# Patient Record
Sex: MALE | Race: WHITE | NOT HISPANIC OR LATINO | Employment: OTHER | URBAN - METROPOLITAN AREA
[De-identification: names, ages, dates, MRNs, and addresses within clinical notes are randomized per-mention and may not be internally consistent; named-entity substitution may affect disease eponyms.]

---

## 2023-03-03 PROBLEM — M25.579 ANKLE PAIN: Status: ACTIVE | Noted: 2023-03-03

## 2023-03-03 PROBLEM — T50.905A MEDICATION REACTION: Status: ACTIVE | Noted: 2023-03-03

## 2023-03-03 PROBLEM — E29.1 HYPOGONADISM, MALE: Status: ACTIVE | Noted: 2023-03-03

## 2023-03-03 PROBLEM — F32.A DEPRESSION: Status: ACTIVE | Noted: 2023-03-03

## 2023-03-03 PROBLEM — E78.5 HYPERLIPIDEMIA: Status: ACTIVE | Noted: 2023-03-03

## 2023-03-03 PROBLEM — M79.672 LEFT FOOT PAIN: Status: ACTIVE | Noted: 2023-03-03

## 2023-03-03 PROBLEM — I10 HTN (HYPERTENSION), BENIGN: Status: ACTIVE | Noted: 2023-03-03

## 2023-03-03 PROBLEM — R21 RASH: Status: ACTIVE | Noted: 2023-03-03

## 2023-03-03 RX ORDER — LOVASTATIN 40 MG/1
40 TABLET ORAL DAILY
COMMUNITY
Start: 2015-07-01 | End: 2023-04-25 | Stop reason: SDUPTHER

## 2023-03-03 RX ORDER — TRIAMCINOLONE ACETONIDE 5 MG/G
1 CREAM TOPICAL 3 TIMES DAILY
COMMUNITY
Start: 2022-07-19 | End: 2023-10-24 | Stop reason: ALTCHOICE

## 2023-03-03 RX ORDER — LAMOTRIGINE 150 MG/1
2 TABLET ORAL DAILY
COMMUNITY
Start: 2015-05-11 | End: 2023-04-25 | Stop reason: WASHOUT

## 2023-03-03 RX ORDER — FLUOXETINE HYDROCHLORIDE 40 MG/1
40 CAPSULE ORAL DAILY
COMMUNITY
Start: 2015-05-08

## 2023-03-03 RX ORDER — LISINOPRIL 40 MG/1
40 TABLET ORAL DAILY
COMMUNITY
Start: 2022-02-14 | End: 2023-04-25 | Stop reason: SDUPTHER

## 2023-03-03 RX ORDER — HYDROCHLOROTHIAZIDE 25 MG/1
25 TABLET ORAL DAILY
COMMUNITY
Start: 2022-02-14 | End: 2023-04-25

## 2023-03-03 RX ORDER — QUETIAPINE FUMARATE 300 MG/1
0.5 TABLET, FILM COATED ORAL NIGHTLY
COMMUNITY
Start: 2014-05-07

## 2023-03-03 RX ORDER — AMLODIPINE BESYLATE 10 MG/1
10 TABLET ORAL DAILY
COMMUNITY
Start: 2022-03-16 | End: 2023-04-25 | Stop reason: SDUPTHER

## 2023-03-03 RX ORDER — AMLODIPINE BESYLATE 5 MG/1
5 TABLET ORAL
COMMUNITY
End: 2023-04-25 | Stop reason: ALTCHOICE

## 2023-03-03 RX ORDER — CLOBETASOL PROPIONATE 0.5 MG/G
1 CREAM TOPICAL 2 TIMES DAILY
COMMUNITY
End: 2023-10-24 | Stop reason: ALTCHOICE

## 2023-03-09 ENCOUNTER — APPOINTMENT (OUTPATIENT)
Dept: PRIMARY CARE | Facility: CLINIC | Age: 63
End: 2023-03-09

## 2023-04-25 ENCOUNTER — LAB (OUTPATIENT)
Dept: LAB | Facility: LAB | Age: 63
End: 2023-04-25
Payer: COMMERCIAL

## 2023-04-25 ENCOUNTER — OFFICE VISIT (OUTPATIENT)
Dept: PRIMARY CARE | Facility: CLINIC | Age: 63
End: 2023-04-25
Payer: COMMERCIAL

## 2023-04-25 VITALS
DIASTOLIC BLOOD PRESSURE: 88 MMHG | HEART RATE: 88 BPM | HEIGHT: 70 IN | SYSTOLIC BLOOD PRESSURE: 138 MMHG | WEIGHT: 233.8 LBS | BODY MASS INDEX: 33.47 KG/M2 | OXYGEN SATURATION: 94 %

## 2023-04-25 DIAGNOSIS — Z13.29 SCREENING FOR THYROID DISORDER: ICD-10-CM

## 2023-04-25 DIAGNOSIS — Z12.5 SCREENING PSA (PROSTATE SPECIFIC ANTIGEN): ICD-10-CM

## 2023-04-25 DIAGNOSIS — E78.5 HYPERLIPIDEMIA, UNSPECIFIED HYPERLIPIDEMIA TYPE: ICD-10-CM

## 2023-04-25 DIAGNOSIS — Z13.6 SCREENING FOR HEART DISEASE: ICD-10-CM

## 2023-04-25 DIAGNOSIS — I10 HTN (HYPERTENSION), BENIGN: ICD-10-CM

## 2023-04-25 DIAGNOSIS — I10 HTN (HYPERTENSION), BENIGN: Primary | ICD-10-CM

## 2023-04-25 PROBLEM — K92.2 GASTROINTESTINAL HEMORRHAGE: Status: ACTIVE | Noted: 2019-03-19

## 2023-04-25 PROBLEM — M77.32 CALCANEAL SPUR, LEFT FOOT: Status: ACTIVE | Noted: 2022-07-19

## 2023-04-25 PROBLEM — K57.32 DIVERTICULITIS OF COLON: Status: ACTIVE | Noted: 2019-03-19

## 2023-04-25 PROBLEM — R22.42 LOCALIZED SWELLING, MASS AND LUMP, LEFT LOWER LIMB: Status: ACTIVE | Noted: 2022-07-19

## 2023-04-25 PROBLEM — M19.072 PRIMARY OSTEOARTHRITIS, LEFT ANKLE AND FOOT: Status: ACTIVE | Noted: 2022-07-19

## 2023-04-25 PROCEDURE — 3075F SYST BP GE 130 - 139MM HG: CPT | Performed by: NURSE PRACTITIONER

## 2023-04-25 PROCEDURE — 80061 LIPID PANEL: CPT

## 2023-04-25 PROCEDURE — 1036F TOBACCO NON-USER: CPT | Performed by: NURSE PRACTITIONER

## 2023-04-25 PROCEDURE — 99214 OFFICE O/P EST MOD 30 MIN: CPT | Performed by: NURSE PRACTITIONER

## 2023-04-25 PROCEDURE — 3079F DIAST BP 80-89 MM HG: CPT | Performed by: NURSE PRACTITIONER

## 2023-04-25 PROCEDURE — 84443 ASSAY THYROID STIM HORMONE: CPT

## 2023-04-25 PROCEDURE — 80053 COMPREHEN METABOLIC PANEL: CPT

## 2023-04-25 PROCEDURE — 85025 COMPLETE CBC W/AUTO DIFF WBC: CPT

## 2023-04-25 PROCEDURE — 84153 ASSAY OF PSA TOTAL: CPT

## 2023-04-25 PROCEDURE — 36415 COLL VENOUS BLD VENIPUNCTURE: CPT

## 2023-04-25 RX ORDER — BETAMETHASONE DIPROPIONATE 0.5 MG/G
CREAM TOPICAL 2 TIMES DAILY
COMMUNITY
Start: 2022-04-08 | End: 2023-10-24 | Stop reason: ALTCHOICE

## 2023-04-25 RX ORDER — LISINOPRIL 40 MG/1
40 TABLET ORAL DAILY
Qty: 90 TABLET | Refills: 1 | Status: SHIPPED | OUTPATIENT
Start: 2023-04-25 | End: 2023-10-24 | Stop reason: SDUPTHER

## 2023-04-25 RX ORDER — LISINOPRIL AND HYDROCHLOROTHIAZIDE 20; 25 MG/1; MG/1
1 TABLET ORAL DAILY
COMMUNITY
Start: 2022-01-28 | End: 2023-04-25

## 2023-04-25 RX ORDER — LOVASTATIN 40 MG/1
40 TABLET ORAL DAILY
Qty: 90 TABLET | Refills: 1 | Status: SHIPPED | OUTPATIENT
Start: 2023-04-25 | End: 2023-10-24 | Stop reason: SDUPTHER

## 2023-04-25 RX ORDER — LAMOTRIGINE 150 MG/1
300 TABLET ORAL DAILY
COMMUNITY

## 2023-04-25 RX ORDER — AMLODIPINE BESYLATE 10 MG/1
10 TABLET ORAL DAILY
Qty: 90 TABLET | Refills: 1 | Status: SHIPPED
Start: 2023-04-25 | End: 2023-10-24 | Stop reason: SDUPTHER

## 2023-04-25 ASSESSMENT — ENCOUNTER SYMPTOMS
CONSTITUTIONAL NEGATIVE: 1
PSYCHIATRIC NEGATIVE: 1
SLEEP DISTURBANCE: 0
ROS GI COMMENTS: RARE GERD
HEMATOLOGIC/LYMPHATIC NEGATIVE: 1
GASTROINTESTINAL NEGATIVE: 1
NEUROLOGICAL NEGATIVE: 1
RESPIRATORY NEGATIVE: 1

## 2023-04-25 NOTE — PATIENT INSTRUCTIONS
Goal of 150 minutes of moderate activity a week  Labs are fasting 10-1 2 hours, do drink water  Coronary calcium score ordered.

## 2023-04-25 NOTE — PROGRESS NOTES
"Subjective   Patient ID: Armen Colvin is a 63 y.o. male who presents for Establish Care (New pt to get established. Former tiffany pt ).    HPI   Patient here to establish with provider, Last seen by Tiffany Park 07/19/2022  Has horrible seasonal allergies and started on Loratadine- Eye itching and top of mouth itchy.  Does occassionally check blood pressure at home- but not recently.  Works as .    Current concerns:  None- concerned somewhat regarding recent heart attack his older brother had, how at risk is he for similar issue?   Chronic Concerns: Hypertension, Depression, Dyslipidemia.  Specialist:   -Counseling- Jeffy-visits once 2-3 months. Manages medications   Labs 02/14/2022   NON SMOKER, ALCOHOL INTAKE- Beer  4-5 cans twice a week during golf season.     Review of Systems   Constitutional: Negative.    HENT:          Noted in HPI   Respiratory: Negative.     Cardiovascular:         Occasional irregular beat  2 x month typically.    Gastrointestinal: Negative.         Rare GERD   Genitourinary: Negative.    Skin: Negative.    Neurological: Negative.    Hematological: Negative.    Psychiatric/Behavioral: Negative.  Negative for sleep disturbance.        Objective   /88 (BP Location: Right arm, Patient Position: Sitting, BP Cuff Size: Large adult)   Pulse 88   Ht 1.778 m (5' 10\")   Wt 106 kg (233 lb 12.8 oz)   SpO2 94%   BMI 33.55 kg/m²     Physical Exam  Vitals reviewed.   Constitutional:       Appearance: Normal appearance.   HENT:      Nose: Nose normal.      Mouth/Throat:      Mouth: Mucous membranes are moist.   Eyes:      Pupils: Pupils are equal, round, and reactive to light.   Neck:      Thyroid: No thyromegaly.   Cardiovascular:      Rate and Rhythm: Normal rate and regular rhythm.      Heart sounds: Normal heart sounds.   Pulmonary:      Effort: Pulmonary effort is normal.      Breath sounds: Normal breath sounds.   Musculoskeletal:         General: Normal range of motion.    "   Cervical back: Normal range of motion and neck supple.   Lymphadenopathy:      Cervical: No cervical adenopathy.   Skin:     General: Skin is warm.   Neurological:      General: No focal deficit present.      Mental Status: He is alert.   Psychiatric:         Mood and Affect: Mood normal.         Behavior: Behavior normal.         Judgment: Judgment normal.       Assessment/Plan   Health Maintenance  Labs- updated orders provided today  Influenza- unknown   Prevnar 13/20- not indicated   Shingrix- Unknown   Colonoscopy- reports few years ago.   PSA 1.26 (02/14/2022)   CT cardiac score-order provided today    Diagnoses and all orders for this visit:  HTN (hypertension), benign  Recommended keeping BP log- communicate readings through My Chart  Has had some swelling of ankles since increase of Amlodipine 10 mg- discussed with him if concerning can change medications etc. At this time does not wish to do.   -     Albumin , Urine Random; Future  -     Comprehensive Metabolic Panel; Future  -     CBC and Auto Differential; Future  -     amLODIPine (Norvasc) 10 mg tablet; Take 1 tablet (10 mg) by mouth once daily.  -     lisinopril 40 mg tablet; Take 1 tablet (40 mg) by mouth once daily.  Hyperlipidemia, unspecified hyperlipidemia type- Lipid panel 02/14/2022 T chol 179, Triglycerides 180, HDL 55, LDL 87  -     Comprehensive Metabolic Panel; Future  -     Lipid Panel; Future  -     CBC and Auto Differential; Future  -     lovastatin (Mevacor) 40 mg tablet; Take 1 tablet (40 mg) by mouth once daily.  Screening for thyroid disorder  -     TSH with reflex to Free T4 if abnormal; Future  Screening PSA (prostate specific antigen) Previous PSA= 1.26 (02/14/2022)   -     Prostate Spec.Ag,Screen; Future  Screening for heart disease  -     CT cardiac scoring wo IV contrast; Future     PLAN/FOLLOW UP  6 months  Lab orders-> call with results  CT cardiac score-> call with results.

## 2023-04-26 LAB
ALANINE AMINOTRANSFERASE (SGPT) (U/L) IN SER/PLAS: 43 U/L (ref 10–52)
ALBUMIN (G/DL) IN SER/PLAS: 5.2 G/DL (ref 3.4–5)
ALKALINE PHOSPHATASE (U/L) IN SER/PLAS: 105 U/L (ref 33–136)
ANION GAP IN SER/PLAS: 16 MMOL/L (ref 10–20)
ASPARTATE AMINOTRANSFERASE (SGOT) (U/L) IN SER/PLAS: 28 U/L (ref 9–39)
BASOPHILS (10*3/UL) IN BLOOD BY AUTOMATED COUNT: 0.04 X10E9/L (ref 0–0.1)
BASOPHILS/100 LEUKOCYTES IN BLOOD BY AUTOMATED COUNT: 0.7 % (ref 0–2)
BILIRUBIN TOTAL (MG/DL) IN SER/PLAS: 1 MG/DL (ref 0–1.2)
CALCIUM (MG/DL) IN SER/PLAS: 10.9 MG/DL (ref 8.6–10.6)
CARBON DIOXIDE, TOTAL (MMOL/L) IN SER/PLAS: 28 MMOL/L (ref 21–32)
CHLORIDE (MMOL/L) IN SER/PLAS: 104 MMOL/L (ref 98–107)
CHOLESTEROL (MG/DL) IN SER/PLAS: 185 MG/DL (ref 0–199)
CHOLESTEROL IN HDL (MG/DL) IN SER/PLAS: 67.1 MG/DL
CHOLESTEROL/HDL RATIO: 2.8
CREATININE (MG/DL) IN SER/PLAS: 0.94 MG/DL (ref 0.5–1.3)
EOSINOPHILS (10*3/UL) IN BLOOD BY AUTOMATED COUNT: 0.21 X10E9/L (ref 0–0.7)
EOSINOPHILS/100 LEUKOCYTES IN BLOOD BY AUTOMATED COUNT: 3.7 % (ref 0–6)
ERYTHROCYTE DISTRIBUTION WIDTH (RATIO) BY AUTOMATED COUNT: 12.9 % (ref 11.5–14.5)
ERYTHROCYTE MEAN CORPUSCULAR HEMOGLOBIN CONCENTRATION (G/DL) BY AUTOMATED: 32.3 G/DL (ref 32–36)
ERYTHROCYTE MEAN CORPUSCULAR VOLUME (FL) BY AUTOMATED COUNT: 97 FL (ref 80–100)
ERYTHROCYTES (10*6/UL) IN BLOOD BY AUTOMATED COUNT: 4.86 X10E12/L (ref 4.5–5.9)
GFR MALE: >90 ML/MIN/1.73M2
GLUCOSE (MG/DL) IN SER/PLAS: 97 MG/DL (ref 74–99)
HEMATOCRIT (%) IN BLOOD BY AUTOMATED COUNT: 47 % (ref 41–52)
HEMOGLOBIN (G/DL) IN BLOOD: 15.2 G/DL (ref 13.5–17.5)
IMMATURE GRANULOCYTES/100 LEUKOCYTES IN BLOOD BY AUTOMATED COUNT: 0.4 % (ref 0–0.9)
LDL: 97 MG/DL (ref 0–99)
LEUKOCYTES (10*3/UL) IN BLOOD BY AUTOMATED COUNT: 5.7 X10E9/L (ref 4.4–11.3)
LYMPHOCYTES (10*3/UL) IN BLOOD BY AUTOMATED COUNT: 1.44 X10E9/L (ref 1.2–4.8)
LYMPHOCYTES/100 LEUKOCYTES IN BLOOD BY AUTOMATED COUNT: 25.4 % (ref 13–44)
MONOCYTES (10*3/UL) IN BLOOD BY AUTOMATED COUNT: 0.59 X10E9/L (ref 0.1–1)
MONOCYTES/100 LEUKOCYTES IN BLOOD BY AUTOMATED COUNT: 10.4 % (ref 2–10)
NEUTROPHILS (10*3/UL) IN BLOOD BY AUTOMATED COUNT: 3.37 X10E9/L (ref 1.2–7.7)
NEUTROPHILS/100 LEUKOCYTES IN BLOOD BY AUTOMATED COUNT: 59.4 % (ref 40–80)
NRBC (PER 100 WBCS) BY AUTOMATED COUNT: 0 /100 WBC (ref 0–0)
PLATELETS (10*3/UL) IN BLOOD AUTOMATED COUNT: 310 X10E9/L (ref 150–450)
POTASSIUM (MMOL/L) IN SER/PLAS: 4.6 MMOL/L (ref 3.5–5.3)
PROSTATE SPECIFIC ANTIGEN,SCREEN: 1.49 NG/ML (ref 0–4)
PROTEIN TOTAL: 8.1 G/DL (ref 6.4–8.2)
SODIUM (MMOL/L) IN SER/PLAS: 143 MMOL/L (ref 136–145)
THYROTROPIN (MIU/L) IN SER/PLAS BY DETECTION LIMIT <= 0.05 MIU/L: 1.22 MIU/L (ref 0.44–3.98)
TRIGLYCERIDE (MG/DL) IN SER/PLAS: 106 MG/DL (ref 0–149)
UREA NITROGEN (MG/DL) IN SER/PLAS: 16 MG/DL (ref 6–23)
VLDL: 21 MG/DL (ref 0–40)

## 2023-05-01 ENCOUNTER — LAB (OUTPATIENT)
Dept: LAB | Facility: LAB | Age: 63
End: 2023-05-01
Payer: COMMERCIAL

## 2023-05-01 DIAGNOSIS — I10 HTN (HYPERTENSION), BENIGN: ICD-10-CM

## 2023-05-01 PROCEDURE — 82043 UR ALBUMIN QUANTITATIVE: CPT

## 2023-05-01 PROCEDURE — 82570 ASSAY OF URINE CREATININE: CPT

## 2023-05-02 DIAGNOSIS — E83.52 SERUM CALCIUM ELEVATED: Primary | ICD-10-CM

## 2023-05-02 DIAGNOSIS — R77.0 ABNORMAL ALBUMIN: ICD-10-CM

## 2023-05-02 LAB
ALBUMIN (MG/L) IN URINE: 7.3 MG/L
ALBUMIN/CREATININE (UG/MG) IN URINE: 5.4 UG/MG CRT (ref 0–30)
CREATININE (MG/DL) IN URINE: 135 MG/DL (ref 20–370)

## 2023-05-02 NOTE — PROGRESS NOTES
Lab results communicated to patient through My Chart 05/02/2023. Abnormal's included elevated serum calcium and albumin. Will place orders to repeat at his convenience.  Patient made aware of all normal results.

## 2023-05-02 NOTE — RESULT ENCOUNTER NOTE
Lab results have a few abnormal's: Serum calcium level & albumin level elevated somewhat. Typically would repeat these labs few weeks out to see if they have stabilized. I will place an order and repeat at your convenience. Lipid panel has improved with triglycerides now within recommended range at 106. TSH, PSA & CBC+d, urine albumin within normal ranges.  Let me know if you have any questions.

## 2023-10-24 ENCOUNTER — OFFICE VISIT (OUTPATIENT)
Dept: PRIMARY CARE | Facility: CLINIC | Age: 63
End: 2023-10-24
Payer: COMMERCIAL

## 2023-10-24 VITALS
HEIGHT: 70 IN | WEIGHT: 245.4 LBS | HEART RATE: 80 BPM | DIASTOLIC BLOOD PRESSURE: 80 MMHG | SYSTOLIC BLOOD PRESSURE: 126 MMHG | BODY MASS INDEX: 35.13 KG/M2 | OXYGEN SATURATION: 96 %

## 2023-10-24 DIAGNOSIS — I10 HTN (HYPERTENSION), BENIGN: Primary | ICD-10-CM

## 2023-10-24 DIAGNOSIS — E78.5 HYPERLIPIDEMIA, UNSPECIFIED HYPERLIPIDEMIA TYPE: ICD-10-CM

## 2023-10-24 DIAGNOSIS — M67.449 GANGLION CYST OF JOINT OF FINGER: ICD-10-CM

## 2023-10-24 DIAGNOSIS — Z13.6 SCREENING FOR HEART DISEASE: ICD-10-CM

## 2023-10-24 PROBLEM — M77.00 MEDIAL EPICONDYLITIS OF ELBOW: Status: ACTIVE | Noted: 2023-10-24

## 2023-10-24 PROBLEM — Z86.010 HISTORY OF COLONIC POLYPS: Status: ACTIVE | Noted: 2023-10-24

## 2023-10-24 PROBLEM — Z86.59 HISTORY OF DEPRESSION: Status: ACTIVE | Noted: 2023-10-24

## 2023-10-24 PROBLEM — K62.5 RECTAL HEMORRHAGE: Status: ACTIVE | Noted: 2019-03-19

## 2023-10-24 PROBLEM — J32.9 SINUSITIS: Status: ACTIVE | Noted: 2023-10-24

## 2023-10-24 PROBLEM — Z86.0100 HISTORY OF COLONIC POLYPS: Status: ACTIVE | Noted: 2023-10-24

## 2023-10-24 PROBLEM — M77.12 LATERAL EPICONDYLITIS OF LEFT ELBOW: Status: ACTIVE | Noted: 2023-10-24

## 2023-10-24 PROBLEM — R19.7 DIARRHEA: Status: ACTIVE | Noted: 2023-10-24

## 2023-10-24 PROBLEM — R10.9 ABDOMINAL PAIN: Status: ACTIVE | Noted: 2023-10-24

## 2023-10-24 PROCEDURE — 3074F SYST BP LT 130 MM HG: CPT | Performed by: NURSE PRACTITIONER

## 2023-10-24 PROCEDURE — 3079F DIAST BP 80-89 MM HG: CPT | Performed by: NURSE PRACTITIONER

## 2023-10-24 PROCEDURE — 99214 OFFICE O/P EST MOD 30 MIN: CPT | Performed by: NURSE PRACTITIONER

## 2023-10-24 PROCEDURE — 1036F TOBACCO NON-USER: CPT | Performed by: NURSE PRACTITIONER

## 2023-10-24 RX ORDER — LISINOPRIL 40 MG/1
40 TABLET ORAL DAILY
Qty: 90 TABLET | Refills: 1 | Status: SHIPPED | OUTPATIENT
Start: 2023-10-24

## 2023-10-24 RX ORDER — METOPROLOL SUCCINATE 25 MG/1
25 TABLET, EXTENDED RELEASE ORAL DAILY
Qty: 30 TABLET | Refills: 1 | Status: SHIPPED
Start: 2023-10-24 | End: 2023-11-27 | Stop reason: SDUPTHER

## 2023-10-24 RX ORDER — LOVASTATIN 40 MG/1
40 TABLET ORAL DAILY
Qty: 90 TABLET | Refills: 1 | Status: SHIPPED
Start: 2023-10-24 | End: 2024-01-18 | Stop reason: ALTCHOICE

## 2023-10-24 RX ORDER — AMLODIPINE BESYLATE 5 MG/1
5 TABLET ORAL DAILY
Qty: 90 TABLET | Refills: 0 | Status: SHIPPED | OUTPATIENT
Start: 2023-10-24 | End: 2024-01-22

## 2023-10-24 ASSESSMENT — ENCOUNTER SYMPTOMS
GASTROINTESTINAL NEGATIVE: 1
CONSTITUTIONAL NEGATIVE: 1
RESPIRATORY NEGATIVE: 1

## 2023-10-24 ASSESSMENT — PAIN SCALES - GENERAL: PAINLEVEL: 0-NO PAIN

## 2023-10-24 NOTE — PROGRESS NOTES
"Subjective   Patient ID: Armen Colvin is a 63 y.o. male who presents for Follow-up (Follow up /LOV 4/25/23/Labs 4/25/23 and 5/1/23).    HPI   Patient here for follow up- last appt 04/25/2023.  [facility]- limited exercise overall.   Current concerns:  1) swelling in the ankles- tender feet.   Chronic concerns: HTN, Depression, Dyslipidemia  Specialist  - Counseling- manages Psychiatric medications- virtual visit    Labs 05/01/2023  NON SMOKER,  ETOH, beer during golf season     Review of Systems   Constitutional: Negative.    Respiratory: Negative.     Cardiovascular:         As noted in HPI   Gastrointestinal: Negative.    Genitourinary: Negative.    Psychiatric/Behavioral:          As noted in HPI       Objective   /80 (BP Location: Left arm, Patient Position: Sitting, BP Cuff Size: Large adult)   Pulse 80   Ht 1.778 m (5' 10\")   Wt 111 kg (245 lb 6.4 oz)   SpO2 96%   BMI 35.21 kg/m²     Physical Exam  Vitals reviewed.   Constitutional:       Appearance: He is obese.   Cardiovascular:      Rate and Rhythm: Normal rate and regular rhythm.      Heart sounds: Normal heart sounds.   Pulmonary:      Effort: Pulmonary effort is normal.      Breath sounds: Normal breath sounds.   Musculoskeletal:      Right hand: Tenderness present. Normal range of motion.      Comments: Right had base of 3 rd finger- round hard to palpation, slightly mobile nodule size approximate M & M    Neurological:      Mental Status: He is alert.       Assessment/Plan   Labs- 05/02/2023, orders placed to repeat CMP, was not completed.   Influenza- unknown   Prevnar 13/20- not indicated   Shingrix- Unknown   Colonoscopy- reports few years ago.   PSA 1.49 (04/25/2023)   CT cardiac score-2nd order provided today  Diagnoses and all orders for this visit:  HTN (hypertension), benign  Keep track of Blood pressure and keep me informed on readings so adjustment can be made if needed.  -     CT cardiac scoring wo IV " contrast; Future  - Decreasing amLODIPine (Norvasc) 10 mg -> 5 mg tablet; Take 1 tablet (5 mg) by mouth once daily.  - Initiated metoprolol succinate XL (Toprol-XL) 25 mg 24 hr tablet; Take 1 tablet (25 mg) by mouth once daily. Do not crush or chew.  -     lisinopril 40 mg tablet; Take 1 tablet (40 mg) by mouth once daily.  Hyperlipidemia, unspecified hyperlipidemia type  -     CT cardiac scoring wo IV contrast; Future  -     lovastatin (Mevacor) 40 mg tablet; Take 1 tablet (40 mg) by mouth once daily.  Screening for heart disease  -     CT cardiac scoring wo IV contrast; Future  Ganglion cyst of joint of finger  -     Referral to Orthopaedic Surgery; Future    Plan: follow up 2 months blood pressure- keep me informed BP readings for dose adjustment   CMP, lipids, CBC complete before next appt  Ct Calcium score review at follow up

## 2023-10-25 ENCOUNTER — APPOINTMENT (OUTPATIENT)
Dept: PRIMARY CARE | Facility: CLINIC | Age: 63
End: 2023-10-25
Payer: COMMERCIAL

## 2023-11-15 ENCOUNTER — LAB (OUTPATIENT)
Dept: LAB | Facility: LAB | Age: 63
End: 2023-11-15
Payer: COMMERCIAL

## 2023-11-15 DIAGNOSIS — E83.52 SERUM CALCIUM ELEVATED: ICD-10-CM

## 2023-11-15 DIAGNOSIS — I10 HTN (HYPERTENSION), BENIGN: ICD-10-CM

## 2023-11-15 DIAGNOSIS — R77.0 ABNORMAL ALBUMIN: ICD-10-CM

## 2023-11-15 DIAGNOSIS — E78.5 HYPERLIPIDEMIA, UNSPECIFIED HYPERLIPIDEMIA TYPE: ICD-10-CM

## 2023-11-15 PROCEDURE — 80061 LIPID PANEL: CPT

## 2023-11-15 PROCEDURE — 36415 COLL VENOUS BLD VENIPUNCTURE: CPT

## 2023-11-15 PROCEDURE — 85025 COMPLETE CBC W/AUTO DIFF WBC: CPT

## 2023-11-15 PROCEDURE — 80053 COMPREHEN METABOLIC PANEL: CPT

## 2023-11-16 LAB
ALBUMIN SERPL BCP-MCNC: 5 G/DL (ref 3.4–5)
ALP SERPL-CCNC: 90 U/L (ref 33–136)
ALT SERPL W P-5'-P-CCNC: 62 U/L (ref 10–52)
ANION GAP SERPL CALC-SCNC: 15 MMOL/L (ref 10–20)
AST SERPL W P-5'-P-CCNC: 34 U/L (ref 9–39)
BASOPHILS # BLD AUTO: 0.05 X10*3/UL (ref 0–0.1)
BASOPHILS NFR BLD AUTO: 0.8 %
BILIRUB SERPL-MCNC: 0.9 MG/DL (ref 0–1.2)
BUN SERPL-MCNC: 11 MG/DL (ref 6–23)
CALCIUM SERPL-MCNC: 9.8 MG/DL (ref 8.6–10.6)
CHLORIDE SERPL-SCNC: 104 MMOL/L (ref 98–107)
CHOLEST SERPL-MCNC: 185 MG/DL (ref 0–199)
CHOLESTEROL/HDL RATIO: 3.1
CO2 SERPL-SCNC: 26 MMOL/L (ref 21–32)
CREAT SERPL-MCNC: 0.86 MG/DL (ref 0.5–1.3)
EOSINOPHIL # BLD AUTO: 0.12 X10*3/UL (ref 0–0.7)
EOSINOPHIL NFR BLD AUTO: 1.9 %
ERYTHROCYTE [DISTWIDTH] IN BLOOD BY AUTOMATED COUNT: 12.3 % (ref 11.5–14.5)
GFR SERPL CREATININE-BSD FRML MDRD: >90 ML/MIN/1.73M*2
GLUCOSE SERPL-MCNC: 94 MG/DL (ref 74–99)
HCT VFR BLD AUTO: 47.1 % (ref 41–52)
HDLC SERPL-MCNC: 59.8 MG/DL
HGB BLD-MCNC: 15.5 G/DL (ref 13.5–17.5)
IMM GRANULOCYTES # BLD AUTO: 0.03 X10*3/UL (ref 0–0.7)
IMM GRANULOCYTES NFR BLD AUTO: 0.5 % (ref 0–0.9)
LDLC SERPL CALC-MCNC: 107 MG/DL
LYMPHOCYTES # BLD AUTO: 1.78 X10*3/UL (ref 1.2–4.8)
LYMPHOCYTES NFR BLD AUTO: 28.8 %
MCH RBC QN AUTO: 31.3 PG (ref 26–34)
MCHC RBC AUTO-ENTMCNC: 32.9 G/DL (ref 32–36)
MCV RBC AUTO: 95 FL (ref 80–100)
MONOCYTES # BLD AUTO: 0.69 X10*3/UL (ref 0.1–1)
MONOCYTES NFR BLD AUTO: 11.2 %
NEUTROPHILS # BLD AUTO: 3.5 X10*3/UL (ref 1.2–7.7)
NEUTROPHILS NFR BLD AUTO: 56.8 %
NON HDL CHOLESTEROL: 125 MG/DL (ref 0–149)
NRBC BLD-RTO: 0 /100 WBCS (ref 0–0)
PLATELET # BLD AUTO: 278 X10*3/UL (ref 150–450)
POTASSIUM SERPL-SCNC: 4.8 MMOL/L (ref 3.5–5.3)
PROT SERPL-MCNC: 7.8 G/DL (ref 6.4–8.2)
RBC # BLD AUTO: 4.95 X10*6/UL (ref 4.5–5.9)
SODIUM SERPL-SCNC: 140 MMOL/L (ref 136–145)
TRIGL SERPL-MCNC: 93 MG/DL (ref 0–149)
VLDL: 19 MG/DL (ref 0–40)
WBC # BLD AUTO: 6.2 X10*3/UL (ref 4.4–11.3)

## 2023-11-27 ENCOUNTER — TELEPHONE (OUTPATIENT)
Dept: PRIMARY CARE | Facility: CLINIC | Age: 63
End: 2023-11-27
Payer: COMMERCIAL

## 2023-11-27 DIAGNOSIS — I10 HTN (HYPERTENSION), BENIGN: ICD-10-CM

## 2023-11-27 RX ORDER — METOPROLOL SUCCINATE 50 MG/1
50 TABLET, EXTENDED RELEASE ORAL DAILY
Qty: 30 TABLET | Refills: 0 | Status: SHIPPED | OUTPATIENT
Start: 2023-11-27 | End: 2023-12-04 | Stop reason: SDUPTHER

## 2023-11-29 ENCOUNTER — TELEPHONE (OUTPATIENT)
Dept: PRIMARY CARE | Facility: CLINIC | Age: 63
End: 2023-11-29
Payer: COMMERCIAL

## 2023-11-29 NOTE — TELEPHONE ENCOUNTER
----- Message from PAOLO Park-CNP sent at 11/27/2023  5:25 PM EST -----  Regarding: BP measurement  Increased Metoprolol to 50 mg once daily- sent to pharmacy. Continue to take the Lisinopril (40 mg) and Amlodipine (5 mg) once daily. Keep track of BP and contact office with readings.

## 2023-12-04 ENCOUNTER — TELEPHONE (OUTPATIENT)
Dept: PRIMARY CARE | Facility: CLINIC | Age: 63
End: 2023-12-04
Payer: COMMERCIAL

## 2023-12-04 RX ORDER — METOPROLOL SUCCINATE 50 MG/1
75 TABLET, EXTENDED RELEASE ORAL DAILY
Qty: 45 TABLET | Refills: 0 | Status: SHIPPED
Start: 2023-12-04 | End: 2023-12-08 | Stop reason: SDUPTHER

## 2023-12-04 NOTE — TELEPHONE ENCOUNTER
Pt called with complaints of BP still being quite elevated, 160/103, 150/102 , pt would like to know if he should double his medicine. please advise

## 2023-12-08 ENCOUNTER — HOSPITAL ENCOUNTER (OUTPATIENT)
Dept: RADIOLOGY | Facility: HOSPITAL | Age: 63
Discharge: HOME | End: 2023-12-08
Payer: COMMERCIAL

## 2023-12-08 ENCOUNTER — TELEPHONE (OUTPATIENT)
Dept: PRIMARY CARE | Facility: CLINIC | Age: 63
End: 2023-12-08
Payer: COMMERCIAL

## 2023-12-08 DIAGNOSIS — Z13.6 SCREENING FOR HEART DISEASE: ICD-10-CM

## 2023-12-08 DIAGNOSIS — I10 HTN (HYPERTENSION), BENIGN: ICD-10-CM

## 2023-12-08 DIAGNOSIS — E78.5 HYPERLIPIDEMIA, UNSPECIFIED HYPERLIPIDEMIA TYPE: ICD-10-CM

## 2023-12-08 PROCEDURE — 75571 CT HRT W/O DYE W/CA TEST: CPT

## 2023-12-08 RX ORDER — METOPROLOL SUCCINATE 50 MG/1
100 TABLET, EXTENDED RELEASE ORAL DAILY
Qty: 60 TABLET | Refills: 0 | Status: SHIPPED
Start: 2023-12-08 | End: 2024-01-03 | Stop reason: SDUPTHER

## 2023-12-08 RX ORDER — METOPROLOL SUCCINATE 50 MG/1
100 TABLET, EXTENDED RELEASE ORAL DAILY
Qty: 60 TABLET | Refills: 0 | Status: SHIPPED | OUTPATIENT
Start: 2023-12-08 | End: 2023-12-08 | Stop reason: SDUPTHER

## 2023-12-08 NOTE — TELEPHONE ENCOUNTER
Patient came into office requesting to speak with you. I advised him you were seeing patients. He is advising you had changed his blood pressure medication but he is still getting readings averaging 160/104 and getting headaches. Patient thinks medication may need to be changed. He is requesting your recommendation or to call him personal.

## 2023-12-11 NOTE — RESULT ENCOUNTER NOTE
CT cardiac score 26.01- considered very low risk for future coronary event (heart attack or stroke) of 0.4%. annual risk.   Recommend health lifestyle of moderate activity of at least 150 minutes a week, obtain and maintain normal Body Mass Index.    Following the Mediterranean diet to reduce risk of heart disease, certain cancers and diabetes.   If you drink alcohol limit intake and avoid nicotine products, including vaping (electronic cigarettes).

## 2023-12-12 ENCOUNTER — TELEPHONE (OUTPATIENT)
Dept: PRIMARY CARE | Facility: CLINIC | Age: 63
End: 2023-12-12
Payer: COMMERCIAL

## 2023-12-12 NOTE — TELEPHONE ENCOUNTER
Pt called in c/o high bp's. His highest BP was 180/107. He c/o dry cough, headaches and he stated that he has been taking an extra metoprolol tablet to make it 150mg. Please advise.

## 2024-01-03 ENCOUNTER — OFFICE VISIT (OUTPATIENT)
Dept: PRIMARY CARE | Facility: CLINIC | Age: 64
End: 2024-01-03
Payer: COMMERCIAL

## 2024-01-03 VITALS
BODY MASS INDEX: 35.93 KG/M2 | OXYGEN SATURATION: 96 % | HEART RATE: 90 BPM | DIASTOLIC BLOOD PRESSURE: 96 MMHG | WEIGHT: 251 LBS | HEIGHT: 70 IN | TEMPERATURE: 97.1 F | SYSTOLIC BLOOD PRESSURE: 140 MMHG

## 2024-01-03 DIAGNOSIS — E78.5 HYPERLIPIDEMIA, UNSPECIFIED HYPERLIPIDEMIA TYPE: ICD-10-CM

## 2024-01-03 DIAGNOSIS — I10 HTN (HYPERTENSION), BENIGN: ICD-10-CM

## 2024-01-03 DIAGNOSIS — R74.8 ELEVATED LIVER ENZYMES: ICD-10-CM

## 2024-01-03 DIAGNOSIS — R06.09 DYSPNEA ON EXERTION: Primary | ICD-10-CM

## 2024-01-03 PROCEDURE — 99214 OFFICE O/P EST MOD 30 MIN: CPT | Performed by: NURSE PRACTITIONER

## 2024-01-03 PROCEDURE — 1036F TOBACCO NON-USER: CPT | Performed by: NURSE PRACTITIONER

## 2024-01-03 PROCEDURE — 3077F SYST BP >= 140 MM HG: CPT | Performed by: NURSE PRACTITIONER

## 2024-01-03 PROCEDURE — 3080F DIAST BP >= 90 MM HG: CPT | Performed by: NURSE PRACTITIONER

## 2024-01-03 RX ORDER — METOPROLOL SUCCINATE 200 MG/1
200 TABLET, EXTENDED RELEASE ORAL DAILY
Qty: 30 TABLET | Refills: 1 | Status: SHIPPED
Start: 2024-01-03 | End: 2024-01-18 | Stop reason: ALTCHOICE

## 2024-01-03 ASSESSMENT — ENCOUNTER SYMPTOMS
CHEST TIGHTNESS: 0
CARDIOVASCULAR NEGATIVE: 1
SHORTNESS OF BREATH: 1
NEUROLOGICAL NEGATIVE: 1
GASTROINTESTINAL NEGATIVE: 1
COUGH: 0
CONSTITUTIONAL NEGATIVE: 1

## 2024-01-03 NOTE — PROGRESS NOTES
"Subjective   Patient ID: Armen Colvin is a 63 y.o. male who presents for Follow-up (Follow upon BP - continuing to be high. Pt denies chest pain, but states he did have a headache a few weeks ago).    HPI   Patient here for follow up high BP. Last seen on 10/24/2023.  Current concern:  1) Adjustment of BP medications continuing, summary: Patient had been on Amlodipine 10 mg, Lisinopril  40 mg with adequate control of BP, side effect of ankle swelling and dose adjustment 10/2023. Initiated Metoprolol 25 mg and decreased Amlodipine 10 mg-> 5 mg.    12/08/2023 increase of metoprolol 100 mg, Amlodipine and Lisinopril at current dose.   12/12/2023 Messaging to increase Metoprolol to 200 mg and continue Amlodipine and Lisinopril at current dose.  Patient reports /105. Currently taking 150 mg Metoprolol.   Chronic concerns: HTN, Depression, Dyslipidemia  Specialist  - Counseling- manages Psychiatric medications- virtual visit    Labs 11/15/2023  NON SMOKER,  ETOH, beer during golf season - In a weeks time 10 beers.   Monitors sodium, but does eat at restaurants,   No soda pop, does drink diet green tea.     Review of Systems   Constitutional: Negative.    Respiratory:  Positive for shortness of breath. Negative for cough and chest tightness.         Shortness of Breath with exertion relatively new.    Cardiovascular: Negative.    Gastrointestinal: Negative.    Neurological: Negative.      Objective   BP (!) 140/96 (BP Location: Left arm, Patient Position: Sitting, BP Cuff Size: Large adult)   Pulse 90   Temp 36.2 °C (97.1 °F)   Ht 1.778 m (5' 10\")   Wt 114 kg (251 lb)   SpO2 96%   BMI 36.01 kg/m²   Weight in October =245 lbs.   138/98 right  140/96 left  Pulse 60     Physical Exam  Vitals reviewed.   Constitutional:       Appearance: He is obese.   Cardiovascular:      Rate and Rhythm: Normal rate and regular rhythm.      Heart sounds: Normal heart sounds.   Pulmonary:      Effort: Pulmonary effort is " normal.      Breath sounds: Normal breath sounds.   Musculoskeletal:         General: Normal range of motion.   Skin:     General: Skin is warm.   Neurological:      General: No focal deficit present.      Mental Status: He is alert.   Psychiatric:         Mood and Affect: Mood normal.       Assessment/Plan   Labs   11/15/2023- reviewed with patient   Influenza- unknown   Prevnar 13/20- not indicated   Shingrix- Unknown   Colonoscopy- reports few years ago.   PSA 1.49 (04/25/2023)   CT cardiac score= 26 (12/08/2023)   Diagnoses and all orders for this visit:  HTN (hypertension), benign / Dyspnea on exertion    -  Increased metoprolol succinate XL (Toprol-XL)150 mg -.> 200 mg 24 hr tablet; Take 1 tablet (200 mg) by mouth once daily. Do not crush or chew.  Continue on lisinopril 40 mg  & Amlodipine 5 mg.   -     Referral to Cardiology; Future  Communicate BP and pulse,   Elevated liver enzymes- ALT 62 (11/15/2023) reviewed with patient, will continue to monitor.   Hyperlipidemia, unspecified hyperlipidemia type  (11/15/2023)  elevated , Tchol 185, HDL 59, Triglycerides 93    Plan: follow up  6-8 weeks.

## 2024-01-03 NOTE — PATIENT INSTRUCTIONS
Call or Message with BP reading and pulse with the increased dose of Metoprolol 200 mg (as well as Amlodipine and lisinopril)  Referral to cardiology

## 2024-01-10 PROBLEM — M67.449 GANGLION OF HAND: Status: ACTIVE | Noted: 2024-01-10

## 2024-01-18 ENCOUNTER — OFFICE VISIT (OUTPATIENT)
Dept: CARDIOLOGY | Facility: CLINIC | Age: 64
End: 2024-01-18
Payer: COMMERCIAL

## 2024-01-18 VITALS
DIASTOLIC BLOOD PRESSURE: 94 MMHG | HEART RATE: 56 BPM | HEIGHT: 70 IN | WEIGHT: 241 LBS | BODY MASS INDEX: 34.5 KG/M2 | OXYGEN SATURATION: 94 % | SYSTOLIC BLOOD PRESSURE: 155 MMHG

## 2024-01-18 DIAGNOSIS — I10 HTN (HYPERTENSION), BENIGN: Primary | ICD-10-CM

## 2024-01-18 DIAGNOSIS — E78.5 HYPERLIPIDEMIA, UNSPECIFIED HYPERLIPIDEMIA TYPE: ICD-10-CM

## 2024-01-18 DIAGNOSIS — R06.09 DYSPNEA ON EXERTION: ICD-10-CM

## 2024-01-18 PROBLEM — R19.7 DIARRHEA: Status: RESOLVED | Noted: 2023-10-24 | Resolved: 2024-01-18

## 2024-01-18 PROBLEM — M25.579 ANKLE PAIN: Status: RESOLVED | Noted: 2023-03-03 | Resolved: 2024-01-18

## 2024-01-18 PROBLEM — R74.8 ELEVATED LIVER ENZYMES: Status: ACTIVE | Noted: 2024-01-18

## 2024-01-18 PROBLEM — K62.5 RECTAL HEMORRHAGE: Status: RESOLVED | Noted: 2019-03-19 | Resolved: 2024-01-18

## 2024-01-18 PROBLEM — R21 RASH: Status: RESOLVED | Noted: 2023-03-03 | Resolved: 2024-01-18

## 2024-01-18 PROBLEM — J32.9 SINUSITIS: Status: RESOLVED | Noted: 2023-10-24 | Resolved: 2024-01-18

## 2024-01-18 PROBLEM — R22.42 LOCALIZED SWELLING, MASS AND LUMP, LEFT LOWER LIMB: Status: RESOLVED | Noted: 2022-07-19 | Resolved: 2024-01-18

## 2024-01-18 PROCEDURE — 1036F TOBACCO NON-USER: CPT | Performed by: STUDENT IN AN ORGANIZED HEALTH CARE EDUCATION/TRAINING PROGRAM

## 2024-01-18 PROCEDURE — 99204 OFFICE O/P NEW MOD 45 MIN: CPT | Performed by: STUDENT IN AN ORGANIZED HEALTH CARE EDUCATION/TRAINING PROGRAM

## 2024-01-18 PROCEDURE — 3080F DIAST BP >= 90 MM HG: CPT | Performed by: STUDENT IN AN ORGANIZED HEALTH CARE EDUCATION/TRAINING PROGRAM

## 2024-01-18 PROCEDURE — 3077F SYST BP >= 140 MM HG: CPT | Performed by: STUDENT IN AN ORGANIZED HEALTH CARE EDUCATION/TRAINING PROGRAM

## 2024-01-18 PROCEDURE — 99214 OFFICE O/P EST MOD 30 MIN: CPT | Performed by: STUDENT IN AN ORGANIZED HEALTH CARE EDUCATION/TRAINING PROGRAM

## 2024-01-18 RX ORDER — ATORVASTATIN CALCIUM 40 MG/1
40 TABLET, FILM COATED ORAL DAILY
Qty: 90 TABLET | Refills: 3 | Status: SHIPPED | OUTPATIENT
Start: 2024-01-18 | End: 2025-01-17

## 2024-01-18 RX ORDER — CARVEDILOL 25 MG/1
25 TABLET ORAL
Qty: 180 TABLET | Refills: 3 | Status: SHIPPED | OUTPATIENT
Start: 2024-01-18 | End: 2025-01-17

## 2024-01-18 RX ORDER — SPIRONOLACTONE 25 MG/1
25 TABLET ORAL DAILY
Qty: 90 TABLET | Refills: 3 | Status: SHIPPED | OUTPATIENT
Start: 2024-01-18 | End: 2025-01-17

## 2024-01-18 ASSESSMENT — ENCOUNTER SYMPTOMS
SHORTNESS OF BREATH: 1
SYNCOPE: 0
ORTHOPNEA: 0
PND: 0
MUSCULOSKELETAL NEGATIVE: 1
DYSPNEA ON EXERTION: 0
NEAR-SYNCOPE: 0
ALLERGIC/IMMUNOLOGIC NEGATIVE: 1
GASTROINTESTINAL NEGATIVE: 1
CONSTITUTIONAL NEGATIVE: 1
ENDOCRINE NEGATIVE: 1
HEMATOLOGIC/LYMPHATIC NEGATIVE: 1
PSYCHIATRIC NEGATIVE: 1
PALPITATIONS: 0
EYES NEGATIVE: 1
HEADACHES: 1

## 2024-01-18 NOTE — PATIENT INSTRUCTIONS
For your high blood pressure- We will stop metoprolol and switch to carvedilol 25 mg twice a day. We will also start a medicine called spironolactone- take spironolactone 25 mg daily.     For your cholesterol we will stop lovastatin and switch to atorvastatin 40 mg daily.     If your shortness of breath doesn't improve we would then consider a heart ultrasound and a heart stress test.     We will see you back in heart clinic in ~ 1 months for a blood pressure check.     If you have any questions, please call my nurse Verena. Her contact information is below.     Thank you for your visit today. Please contact our office (via [employer] or phone) with any additional questions.     [facility]    [place], [place]/Clinic Nurse for:    Dr. Madina Rowley    [address] [facility], [address]  [place] [address]    Phone: [phone] Press Option 5 then Option 3 to speak with the Clinic Nurse (Verena)    _____    To Reach:    Billing Questions -    [phone]  Scheduling / Rescheduling -  Option 1  Refills / Medication Requests -  Option 3  General Office /  -  Option 4  Results -     Option 6  Medical Records -    Option 7  Repeat Options -    Option 9

## 2024-01-18 NOTE — PROGRESS NOTES
Cardiology New Patient History and Physical    Reason for referral: ASHD, HTN    HPI: Armen Colvin is a 63 y.o.  male who presents today for hypertension, atherosclerotic heart disease. Past medical history of HTN, DLD, obesity, hx diverticulitis, and depression.     Patient presented to cardiology clinic on 1/18/2024.  Patient notes intermittent headaches and dyspnea in setting of suboptimally controlled hypertension.  Per patient home BP ranging from 150-180/ mmHg.  Patient denies any chest pain or dyspnea or headaches at this time.  Of note amlodipine was recently down titrated from 10 mg daily to 5 mg daily due to lower extremity swelling.  After decreasing amlodipine lower extremity edema has improved.  Patient currently on lisinopril 40 mg daily, amlodipine 5 mg daily, metoprolol  mg daily.    Patient denies any exertional chest pain.  Patient does note intermittent brief exertional dyspnea as above.  No associated nausea vomiting or diaphoresis.  Family history is notable for coronary disease in his brother who had an MI at age 65 requiring 2 drug-eluting stents.  Patient is a non-smoker and works as an .      Past Medical History:   - As above    Surgical History:   He has a past surgical history that includes Other surgical history (09/16/2014) and Tonsillectomy (09/16/2014).    Family History:   Family History   Problem Relation Name Age of Onset    Other (PACEMAKER CARDIAC) Mother      Diabetes Mother      Hypertension Mother      Hypertension Father      Hypertension Sister      Coronary artery disease Brother  65        x2 stents    Heart attack Brother      Allergies Other         Allergies:  Fruit flavor, Lactase, and Hydrochlorothiazide     Social History:   - Non-smoker; moderate alcohol use (~ 12 drinks a week; light beer); no illicit drug use  - Employed:      Prior Cardiovascular Testing (personally reviewed):     CT cardiac Scoring (12/2023)  LM       "      1.08  LAD           24.93  LCx           0  RCA           0  Total 26.01    Review of Systems:  Review of Systems   Constitutional: Negative.   HENT: Negative.     Eyes: Negative.    Cardiovascular:  Negative for chest pain, dyspnea on exertion, near-syncope, orthopnea, palpitations, paroxysmal nocturnal dyspnea and syncope.   Respiratory:  Positive for shortness of breath.    Endocrine: Negative.    Hematologic/Lymphatic: Negative.    Skin: Negative.    Musculoskeletal: Negative.    Gastrointestinal: Negative.    Genitourinary: Negative.    Neurological:  Positive for headaches.   Psychiatric/Behavioral: Negative.     Allergic/Immunologic: Negative.        Objective     Outpatient Medications:    Current Outpatient Medications:     amLODIPine (Norvasc) 5 mg tablet, Take 1 tablet (5 mg) by mouth once daily., Disp: 90 tablet, Rfl: 0    FLUoxetine (PROzac) 40 mg capsule, Take 1 capsule (40 mg) by mouth once daily., Disp: , Rfl:     lamoTRIgine (LaMICtal) 150 mg tablet, Take 2 tablets (300 mg) by mouth once daily., Disp: , Rfl:     lisinopril 40 mg tablet, Take 1 tablet (40 mg) by mouth once daily., Disp: 90 tablet, Rfl: 1    QUEtiapine (SEROquel) 300 mg tablet, Take 0.5 tablets (150 mg) by mouth once daily at bedtime., Disp: , Rfl:     atorvastatin (Lipitor) 40 mg tablet, Take 1 tablet (40 mg) by mouth once daily., Disp: 90 tablet, Rfl: 3    carvedilol (Coreg) 25 mg tablet, Take 1 tablet (25 mg) by mouth 2 times a day with meals., Disp: 180 tablet, Rfl: 3    spironolactone (Aldactone) 25 mg tablet, Take 1 tablet (25 mg) by mouth once daily., Disp: 90 tablet, Rfl: 3     Last Recorded Vitals  BP (!) 155/94 (BP Location: Left arm, Patient Position: Sitting, BP Cuff Size: Adult)   Pulse 56   Ht 1.778 m (5' 10\")   Wt 109 kg (241 lb)   SpO2 94%   BMI 34.58 kg/m²     Physical Exam:  Physical Exam  Constitutional:       General: He is not in acute distress.  HENT:      Head: Normocephalic.      Mouth/Throat:      " "Mouth: Mucous membranes are moist.   Eyes:      Extraocular Movements: Extraocular movements intact.      Conjunctiva/sclera: Conjunctivae normal.   Neck:      Vascular: No JVD.   Cardiovascular:      Rate and Rhythm: Normal rate and regular rhythm.      Heart sounds: No murmur heard.  Pulmonary:      Effort: Pulmonary effort is normal. No respiratory distress.      Breath sounds: Normal breath sounds.   Abdominal:      General: Bowel sounds are normal. There is no distension.      Palpations: Abdomen is soft.   Musculoskeletal:         General: No swelling.   Skin:     General: Skin is warm and dry.   Neurological:      General: No focal deficit present.      Mental Status: He is alert.      Cranial Nerves: No cranial nerve deficit.      Motor: No weakness.   Psychiatric:         Mood and Affect: Mood normal.         Behavior: Behavior normal.         Lab Review:    Lab Results   Component Value Date    GLUCOSE 94 11/15/2023    CALCIUM 9.8 11/15/2023     11/15/2023    K 4.8 11/15/2023    CO2 26 11/15/2023     11/15/2023    BUN 11 11/15/2023    CREATININE 0.86 11/15/2023       Lab Results   Component Value Date    WBC 6.2 11/15/2023    HGB 15.5 11/15/2023    HCT 47.1 11/15/2023    MCV 95 11/15/2023     11/15/2023       Lab Results   Component Value Date    CHOL 185 11/15/2023    CHOL 185 04/25/2023    CHOL 179 02/11/2022     Lab Results   Component Value Date    HDL 59.8 11/15/2023    HDL 67.1 04/25/2023    HDL 55.6 02/11/2022     Lab Results   Component Value Date    LDLCALC 107 (H) 11/15/2023     Lab Results   Component Value Date    TRIG 93 11/15/2023    TRIG 106 04/25/2023    TRIG 180 (H) 02/11/2022     No components found for: \"CHOLHDL\"    No results found for: \"BNP\"    Lab Results   Component Value Date    TSH 1.22 04/25/2023       Assessment:   63 y.o.  male who presents today for hypertension, atherosclerotic heart disease. Past medical history of HTN, DLD, obesity, hx " "diverticulitis, and depression.     Patient with suboptimally controlled blood pressure after amlodipine down titration.  Will switch from metoprolol XL to carvedilol 25 mg twice daily and add spironolactone 25 mg daily for additional blood pressure control.  If headaches and intermittent dyspnea do not improve after improved blood pressure control would then recommend exercise stress echo and complete transthoracic echocardiogram for further evaluation.  Patient's cardiac risk factors include mild coronary artery calcification, hypertension, dyslipidemia, and family history of coronary disease in his brother.    We discussed the option of stress testing at this time, patient would like to try antihypertensive therapy first.    Overall Plan:  1.  Exertional dyspnea  - Possibly secondary to suboptimally controlled hypertension  - Exertional dyspnea is also an anginal equivalent; discussed the option of exercise stress testing as above, we will trial antihypertensive therapy first and if symptoms do not improve patient is agreeable to proceed with exercise stress echocardiography    2.  Hypertension (goal blood pressure less than 130/90 mmHg)  - Continue lisinopril 40 mg daily  - Continue amlodipine 5 mg daily  - Switch from metoprolol XL to carvedilol 25 mg twice daily  - Add spironolactone 25 mg daily  - If hypertension remains suboptimally controlled on above regiment would then consider evaluation for secondary causes of hypertension with renal artery ultrasound, plasma and nephrons, renal and/Lewis ratio and transthoracic echocardiogram    3.  Dyslipidemia (goal LDL less than 70 mg/dL)  - Suboptimal control on lovastatin  - Discussed dietary lifestyle modifications; encouraged Mediterranean style diet  - Switch from lovastatin to atorvastatin 40 mg daily    4. Discussed \"red flag\" symptoms that should prompt immediate medical attention; patient verbalized understanding    Disposition: Return to cardiology clinic " in 1 month for a follow-up for hypertension management, exertional dyspnea     Raheem Painter MD

## 2024-01-19 ENCOUNTER — TELEPHONE (OUTPATIENT)
Dept: CARDIOLOGY | Facility: CLINIC | Age: 64
End: 2024-01-19
Payer: COMMERCIAL

## 2024-01-19 NOTE — TELEPHONE ENCOUNTER
Left detailed message re: bp meds are as follows: Lisinopril 40mg daily, amlodipine 5mg daily, carvedilol 25mg bid, and spironolactone 25mg daily.  Metoprolol stopped. Lovastatin stopped.

## 2024-02-12 ENCOUNTER — APPOINTMENT (OUTPATIENT)
Dept: PRIMARY CARE | Facility: CLINIC | Age: 64
End: 2024-02-12
Payer: COMMERCIAL

## 2024-02-20 ENCOUNTER — APPOINTMENT (OUTPATIENT)
Dept: CARDIOLOGY | Facility: CLINIC | Age: 64
End: 2024-02-20
Payer: COMMERCIAL

## 2024-02-29 ENCOUNTER — OFFICE VISIT (OUTPATIENT)
Dept: CARDIOLOGY | Facility: CLINIC | Age: 64
End: 2024-02-29
Payer: COMMERCIAL

## 2024-02-29 VITALS
HEART RATE: 64 BPM | WEIGHT: 241 LBS | OXYGEN SATURATION: 96 % | SYSTOLIC BLOOD PRESSURE: 108 MMHG | HEIGHT: 70 IN | DIASTOLIC BLOOD PRESSURE: 64 MMHG | BODY MASS INDEX: 34.5 KG/M2

## 2024-02-29 DIAGNOSIS — I10 HTN (HYPERTENSION), BENIGN: ICD-10-CM

## 2024-02-29 DIAGNOSIS — R06.09 DYSPNEA ON EXERTION: ICD-10-CM

## 2024-02-29 DIAGNOSIS — E78.5 HYPERLIPIDEMIA, UNSPECIFIED HYPERLIPIDEMIA TYPE: ICD-10-CM

## 2024-02-29 PROCEDURE — 99212 OFFICE O/P EST SF 10 MIN: CPT | Performed by: STUDENT IN AN ORGANIZED HEALTH CARE EDUCATION/TRAINING PROGRAM

## 2024-02-29 PROCEDURE — 3074F SYST BP LT 130 MM HG: CPT | Performed by: STUDENT IN AN ORGANIZED HEALTH CARE EDUCATION/TRAINING PROGRAM

## 2024-02-29 PROCEDURE — 1036F TOBACCO NON-USER: CPT | Performed by: STUDENT IN AN ORGANIZED HEALTH CARE EDUCATION/TRAINING PROGRAM

## 2024-02-29 PROCEDURE — 3078F DIAST BP <80 MM HG: CPT | Performed by: STUDENT IN AN ORGANIZED HEALTH CARE EDUCATION/TRAINING PROGRAM

## 2024-02-29 ASSESSMENT — ENCOUNTER SYMPTOMS
CONSTITUTIONAL NEGATIVE: 1
PALPITATIONS: 0
EYES NEGATIVE: 1
PSYCHIATRIC NEGATIVE: 1
PND: 0
NEAR-SYNCOPE: 0
SYNCOPE: 0
ORTHOPNEA: 0
DYSPNEA ON EXERTION: 0
HEMATOLOGIC/LYMPHATIC NEGATIVE: 1
GASTROINTESTINAL NEGATIVE: 1
ALLERGIC/IMMUNOLOGIC NEGATIVE: 1
HEADACHES: 1
SHORTNESS OF BREATH: 0
ENDOCRINE NEGATIVE: 1
MUSCULOSKELETAL NEGATIVE: 1

## 2024-02-29 NOTE — PROGRESS NOTES
Cardiology Established Patient Visit    Reason for visit: ASHD, HTN    HPI: Armen Colvin is a 64 y.o.  male who presents today for a follow-up for hypertension, atherosclerotic heart disease. Past medical history of HTN, DLD, obesity, hx diverticulitis, and depression.     Patient presented to cardiology clinic on 1/18/2024.  Patient notes intermittent headaches and dyspnea in setting of suboptimally controlled hypertension.  Per patient home BP ranging from 150-180/ mmHg.  Patient denies any chest pain or dyspnea or headaches at this time.  Of note amlodipine was recently down titrated from 10 mg daily to 5 mg daily due to lower extremity swelling.  After decreasing amlodipine lower extremity edema has improved.  Patient currently on lisinopril 40 mg daily, amlodipine 5 mg daily, metoprolol  mg daily.    Patient denies any exertional chest pain.  Patient does note intermittent brief exertional dyspnea as above.  No associated nausea vomiting or diaphoresis.  Family history is notable for coronary disease in his brother who had an MI at age 65 requiring 2 drug-eluting stents.  Patient is a non-smoker and works as an .    At our visit on 1/18/2024, switched from metoprolol XL to carvedilol 25 mg twice daily and added spironolactone 25 mg daily for additional blood pressure control.  If headaches and intermittent dyspnea did improve after improved blood pressure control would then recommend exercise stress echo and complete transthoracic echocardiogram for further evaluation.  Patient's cardiac risk factors include mild coronary artery calcification, hypertension, dyslipidemia, and family history of coronary disease in his brother.    Armen presented to cardiology clinic on 2/29/2024.  Patient is currently asymptomatic from a cardiovascular standpoint.  No chest pains or dyspnea.  Hypertension much improved and not at goal after above antihypertensive titration.  Tolerating  physical activity without active cardiovascular complaints.    Past Medical History:   - As above    Surgical History:   He has a past surgical history that includes Other surgical history (09/16/2014) and Tonsillectomy (09/16/2014).    Family History:   Family History   Problem Relation Name Age of Onset    Other (PACEMAKER CARDIAC) Mother      Diabetes Mother      Hypertension Mother      Hypertension Father      Hypertension Sister      Coronary artery disease Brother  65        x2 stents    Heart attack Brother      Allergies Other         Allergies:  Fruit flavor, Lactase, and Hydrochlorothiazide     Social History:   - Non-smoker; moderate alcohol use (~ 12 drinks a week; light beer); no illicit drug use  - Employed:      Prior Cardiovascular Testing (personally reviewed):     CT cardiac Scoring (12/2023)  LM            1.08  LAD           24.93  LCx           0  RCA           0  Total 26.01    Review of Systems:  Review of Systems   Constitutional: Negative.   HENT: Negative.     Eyes: Negative.    Cardiovascular:  Negative for chest pain, dyspnea on exertion, near-syncope, orthopnea, palpitations, paroxysmal nocturnal dyspnea and syncope.   Respiratory:  Negative for shortness of breath.    Endocrine: Negative.    Hematologic/Lymphatic: Negative.    Skin: Negative.    Musculoskeletal: Negative.    Gastrointestinal: Negative.    Genitourinary: Negative.    Neurological:  Positive for headaches.   Psychiatric/Behavioral: Negative.     Allergic/Immunologic: Negative.        Objective     Outpatient Medications:    Current Outpatient Medications:     amLODIPine (Norvasc) 5 mg tablet, TAKE 1 TABLET BY MOUTH ONE TIME DAILY, Disp: 90 tablet, Rfl: 0    atorvastatin (Lipitor) 40 mg tablet, Take 1 tablet (40 mg) by mouth once daily., Disp: 90 tablet, Rfl: 3    carvedilol (Coreg) 25 mg tablet, Take 1 tablet (25 mg) by mouth 2 times a day with meals., Disp: 180 tablet, Rfl: 3    FLUoxetine (PROzac) 40 mg  capsule, Take 1 capsule (40 mg) by mouth once daily., Disp: , Rfl:     lamoTRIgine (LaMICtal) 150 mg tablet, Take 2 tablets (300 mg) by mouth once daily., Disp: , Rfl:     lisinopril 40 mg tablet, Take 1 tablet (40 mg) by mouth once daily., Disp: 90 tablet, Rfl: 1    QUEtiapine (SEROquel) 300 mg tablet, Take 0.5 tablets (150 mg) by mouth once daily at bedtime., Disp: , Rfl:     spironolactone (Aldactone) 25 mg tablet, Take 1 tablet (25 mg) by mouth once daily., Disp: 90 tablet, Rfl: 3     Last Recorded Vitals  There were no vitals taken for this visit.    Physical Exam:  Physical Exam  Constitutional:       General: He is not in acute distress.  HENT:      Head: Normocephalic.      Mouth/Throat:      Mouth: Mucous membranes are moist.   Eyes:      Extraocular Movements: Extraocular movements intact.      Conjunctiva/sclera: Conjunctivae normal.   Neck:      Vascular: No JVD.   Cardiovascular:      Rate and Rhythm: Normal rate and regular rhythm.      Heart sounds: No murmur heard.  Pulmonary:      Effort: Pulmonary effort is normal. No respiratory distress.      Breath sounds: Normal breath sounds.   Abdominal:      General: There is no distension.   Musculoskeletal:         General: No swelling.   Skin:     General: Skin is warm and dry.   Neurological:      General: No focal deficit present.      Mental Status: He is alert.      Cranial Nerves: No cranial nerve deficit.      Motor: No weakness.   Psychiatric:         Mood and Affect: Mood normal.         Behavior: Behavior normal.         Lab Review:    Lab Results   Component Value Date    GLUCOSE 94 11/15/2023    CALCIUM 9.8 11/15/2023     11/15/2023    K 4.8 11/15/2023    CO2 26 11/15/2023     11/15/2023    BUN 11 11/15/2023    CREATININE 0.86 11/15/2023       Lab Results   Component Value Date    WBC 6.2 11/15/2023    HGB 15.5 11/15/2023    HCT 47.1 11/15/2023    MCV 95 11/15/2023     11/15/2023       Lab Results   Component Value Date     "CHOL 185 11/15/2023    CHOL 185 04/25/2023    CHOL 179 02/11/2022     Lab Results   Component Value Date    HDL 59.8 11/15/2023    HDL 67.1 04/25/2023    HDL 55.6 02/11/2022     Lab Results   Component Value Date    LDLCALC 107 (H) 11/15/2023     Lab Results   Component Value Date    TRIG 93 11/15/2023    TRIG 106 04/25/2023    TRIG 180 (H) 02/11/2022     No components found for: \"CHOLHDL\"    No results found for: \"BNP\"    Lab Results   Component Value Date    TSH 1.22 04/25/2023       Assessment:   64 y.o.  male who presents today for a follow-up for hypertension, atherosclerotic heart disease. Past medical history of HTN, DLD, obesity, hx diverticulitis, and depression.     Armen presented to cardiology clinic on 2/29/2024.  Patient is currently asymptomatic from a cardiovascular standpoint.  No chest pains or dyspnea.  Hypertension much improved and not at goal after above antihypertensive titration.  Tolerating physical activity without active cardiovascular complaints.    Continue cardiovascular management as below.  We will defer stress testing at this time as patient is currently asymptomatic.    Overall Plan:  1.  Exertional dyspnea (resolved)   - Likely secondary to suboptimally controlled hypertension as symptoms improved after better hypertension management  - If exertional dyspnea returned would then consider exercise stress echocardiography for further risk stratification; we will defer for now as patient currently asymptomatic    2.  Hypertension (goal blood pressure less than 130/90 mmHg; at goal)  - Continue lisinopril 40 mg daily  - Continue amlodipine 5 mg daily  - Continue carvedilol 25 mg twice daily  - Continue spironolactone 25 mg daily  - If patient hypotensive and symptomatic would then stop amlodipine and continue lisinopril, carvedilol, and spironolactone    3.  Dyslipidemia (goal LDL less than 70 mg/dL)  - Continue atorvastatin  - Continue dietary and lifestyle " "modifications; encouraged Mediterranean style diet  - Repeat fasting lipid panel in 6 months    4. Discussed \"red flag\" symptoms that should prompt immediate medical attention; patient verbalized understanding    Disposition: Return to cardiology clinic in 6 months or earlier if needed    Raheem Painter MD        "

## 2024-07-31 DIAGNOSIS — E78.5 HYPERLIPIDEMIA, UNSPECIFIED HYPERLIPIDEMIA TYPE: ICD-10-CM

## 2024-07-31 RX ORDER — LOVASTATIN 40 MG/1
40 TABLET ORAL DAILY
Qty: 90 TABLET | Refills: 0 | OUTPATIENT
Start: 2024-07-31

## 2024-08-13 PROBLEM — M19.071 PRIMARY OSTEOARTHRITIS, RIGHT ANKLE AND FOOT: Status: ACTIVE | Noted: 2024-08-13

## 2024-08-29 ENCOUNTER — APPOINTMENT (OUTPATIENT)
Dept: CARDIOLOGY | Facility: CLINIC | Age: 64
End: 2024-08-29
Payer: COMMERCIAL

## 2024-10-07 ENCOUNTER — TELEPHONE (OUTPATIENT)
Dept: PRIMARY CARE | Facility: CLINIC | Age: 64
End: 2024-10-07

## 2024-10-07 ENCOUNTER — APPOINTMENT (OUTPATIENT)
Dept: PRIMARY CARE | Facility: CLINIC | Age: 64
End: 2024-10-07
Payer: COMMERCIAL

## 2024-10-07 DIAGNOSIS — R05.1 ACUTE COUGH: ICD-10-CM

## 2024-10-07 DIAGNOSIS — U07.1 POSITIVE SELF-ADMINISTERED ANTIGEN TEST FOR COVID-19: Primary | ICD-10-CM

## 2024-10-07 RX ORDER — ALBUTEROL SULFATE 90 UG/1
2 INHALANT RESPIRATORY (INHALATION) EVERY 4 HOURS PRN
Qty: 8 G | Refills: 1 | Status: SHIPPED | OUTPATIENT
Start: 2024-10-07 | End: 2025-10-07

## 2024-10-07 RX ORDER — BENZONATATE 100 MG/1
100 CAPSULE ORAL 3 TIMES DAILY PRN
Qty: 42 CAPSULE | Refills: 0 | Status: SHIPPED | OUTPATIENT
Start: 2024-10-07 | End: 2024-11-06

## 2024-10-07 NOTE — TELEPHONE ENCOUNTER
"Pt called stating he tested positive for COVID and would like to know what to do from here.     He has a sore throat, congestion in the head, coughing, \"gunky eyes\" some nausea, full body sweats. Light headed especially when standing up. Hard to lay down and go to bed, makes things worse. Voice is raspy.     He's tried about everything otc. Sudafed, ibuprofen, robitussin etc.     This started Friday afternoon.     DDM in [place] on [address]  "

## 2024-10-08 ENCOUNTER — APPOINTMENT (OUTPATIENT)
Dept: PRIMARY CARE | Facility: CLINIC | Age: 64
End: 2024-10-08
Payer: COMMERCIAL

## 2024-10-10 ENCOUNTER — TELEPHONE (OUTPATIENT)
Dept: PRIMARY CARE | Facility: CLINIC | Age: 64
End: 2024-10-10
Payer: COMMERCIAL

## 2024-10-10 NOTE — TELEPHONE ENCOUNTER
Pt called stating he has COVID again/still and he would like to know if there's a cough medicine he could take or anything OTC recommended? He feels like this just isn't going away and he's afraid it's deep in his chest.

## 2024-10-18 ENCOUNTER — TELEPHONE (OUTPATIENT)
Dept: PRIMARY CARE | Facility: CLINIC | Age: 64
End: 2024-10-18
Payer: COMMERCIAL

## 2024-10-18 DIAGNOSIS — J06.9 UPPER RESPIRATORY TRACT INFECTION, UNSPECIFIED TYPE: Primary | ICD-10-CM

## 2024-10-18 DIAGNOSIS — R05.1 ACUTE COUGH: ICD-10-CM

## 2024-10-18 RX ORDER — AZITHROMYCIN 250 MG/1
TABLET, FILM COATED ORAL
Qty: 6 TABLET | Refills: 0 | Status: SHIPPED | OUTPATIENT
Start: 2024-10-18 | End: 2024-10-23

## 2024-10-18 RX ORDER — METHYLPREDNISOLONE 4 MG/1
TABLET ORAL
Qty: 21 TABLET | Refills: 0 | Status: SHIPPED | OUTPATIENT
Start: 2024-10-18 | End: 2024-10-25

## 2024-10-18 NOTE — TELEPHONE ENCOUNTER
Pt left voice mail requesting to have something sent in for his cough. He's still having a very bad cough. He would like to have something sent in that's strong but not strong enough to knock him out?  Was COVID + previously 10/7/24.    [facility] verified on file still    LOV 1/3/24  NOV None

## 2025-02-05 ENCOUNTER — TELEPHONE (OUTPATIENT)
Dept: PRIMARY CARE | Facility: CLINIC | Age: 65
End: 2025-02-05
Payer: COMMERCIAL

## 2025-02-05 DIAGNOSIS — I10 HTN (HYPERTENSION), BENIGN: ICD-10-CM

## 2025-02-05 RX ORDER — ATORVASTATIN CALCIUM 40 MG/1
40 TABLET, FILM COATED ORAL DAILY
Qty: 90 TABLET | Refills: 3 | Status: SHIPPED | OUTPATIENT
Start: 2025-02-05 | End: 2026-02-05

## 2025-02-05 RX ORDER — SPIRONOLACTONE 25 MG/1
25 TABLET ORAL DAILY
Qty: 90 TABLET | Refills: 3 | Status: SHIPPED | OUTPATIENT
Start: 2025-02-05 | End: 2026-02-05

## 2025-02-05 NOTE — TELEPHONE ENCOUNTER
Pt called requesting refill of     AMLODIPINE  5 mg      NOV 2/11/25,   Pt has 1 pill left please advise

## 2025-02-07 DIAGNOSIS — I10 HTN (HYPERTENSION), BENIGN: ICD-10-CM

## 2025-02-07 RX ORDER — AMLODIPINE BESYLATE 5 MG/1
5 TABLET ORAL DAILY
Qty: 30 TABLET | Refills: 0 | Status: SHIPPED | OUTPATIENT
Start: 2025-02-07

## 2025-02-11 ENCOUNTER — APPOINTMENT (OUTPATIENT)
Dept: PRIMARY CARE | Facility: CLINIC | Age: 65
End: 2025-02-11
Payer: COMMERCIAL

## 2025-03-04 DIAGNOSIS — I10 HTN (HYPERTENSION), BENIGN: ICD-10-CM

## 2025-03-04 RX ORDER — AMLODIPINE BESYLATE 5 MG/1
5 TABLET ORAL DAILY
Qty: 30 TABLET | Refills: 0 | OUTPATIENT
Start: 2025-03-04